# Patient Record
Sex: FEMALE | Race: OTHER | NOT HISPANIC OR LATINO | URBAN - METROPOLITAN AREA
[De-identification: names, ages, dates, MRNs, and addresses within clinical notes are randomized per-mention and may not be internally consistent; named-entity substitution may affect disease eponyms.]

---

## 2017-04-07 ENCOUNTER — OUTPATIENT (OUTPATIENT)
Dept: OUTPATIENT SERVICES | Facility: HOSPITAL | Age: 82
LOS: 1 days | Discharge: HOME | End: 2017-04-07

## 2017-04-07 DIAGNOSIS — C85.88 OTHER SPECIFIED TYPES OF NON-HODGKIN LYMPHOMA, LYMPH NODES OF MULTIPLE SITES: ICD-10-CM

## 2017-04-09 ENCOUNTER — RESULT REVIEW (OUTPATIENT)
Age: 82
End: 2017-04-09

## 2017-04-10 ENCOUNTER — APPOINTMENT (OUTPATIENT)
Dept: HEMATOLOGY ONCOLOGY | Facility: CLINIC | Age: 82
End: 2017-04-10

## 2017-04-10 VITALS
BODY MASS INDEX: 24.37 KG/M2 | TEMPERATURE: 98.5 F | OXYGEN SATURATION: 98 % | WEIGHT: 130.73 LBS | HEART RATE: 83 BPM | SYSTOLIC BLOOD PRESSURE: 147 MMHG | RESPIRATION RATE: 16 BRPM | DIASTOLIC BLOOD PRESSURE: 82 MMHG

## 2017-04-10 LAB
BASOPHILS # BLD AUTO: 0 K/UL — SIGNIFICANT CHANGE UP (ref 0–0.2)
BASOPHILS NFR BLD AUTO: 0.3 % — SIGNIFICANT CHANGE UP (ref 0–2)
EOSINOPHIL # BLD AUTO: 0 K/UL — SIGNIFICANT CHANGE UP (ref 0–0.5)
EOSINOPHIL NFR BLD AUTO: 0.4 % — SIGNIFICANT CHANGE UP (ref 0–6)
HCT VFR BLD CALC: 44.3 % — SIGNIFICANT CHANGE UP (ref 34.5–45)
HGB BLD-MCNC: 15.2 G/DL — SIGNIFICANT CHANGE UP (ref 11.5–15.5)
LYMPHOCYTES # BLD AUTO: 1.3 K/UL — SIGNIFICANT CHANGE UP (ref 1–3.3)
LYMPHOCYTES # BLD AUTO: 20.6 % — SIGNIFICANT CHANGE UP (ref 13–44)
MCHC RBC-ENTMCNC: 32.2 PG — SIGNIFICANT CHANGE UP (ref 27–34)
MCHC RBC-ENTMCNC: 34.4 G/DL — SIGNIFICANT CHANGE UP (ref 32–36)
MCV RBC AUTO: 93.6 FL — SIGNIFICANT CHANGE UP (ref 80–100)
MONOCYTES # BLD AUTO: 0.8 K/UL — SIGNIFICANT CHANGE UP (ref 0–0.9)
MONOCYTES NFR BLD AUTO: 12.1 % — SIGNIFICANT CHANGE UP (ref 2–14)
NEUTROPHILS # BLD AUTO: 4.2 K/UL — SIGNIFICANT CHANGE UP (ref 1.8–7.4)
NEUTROPHILS NFR BLD AUTO: 66.6 % — SIGNIFICANT CHANGE UP (ref 43–77)
PLATELET # BLD AUTO: 225 K/UL — SIGNIFICANT CHANGE UP (ref 150–400)
RBC # BLD: 4.73 M/UL — SIGNIFICANT CHANGE UP (ref 3.8–5.2)
RBC # FLD: 11.5 % — SIGNIFICANT CHANGE UP (ref 10.3–14.5)
WBC # BLD: 6.3 K/UL — SIGNIFICANT CHANGE UP (ref 3.8–10.5)
WBC # FLD AUTO: 6.3 K/UL — SIGNIFICANT CHANGE UP (ref 3.8–10.5)

## 2017-04-16 ENCOUNTER — FORM ENCOUNTER (OUTPATIENT)
Age: 82
End: 2017-04-16

## 2017-04-17 ENCOUNTER — APPOINTMENT (OUTPATIENT)
Dept: NUCLEAR MEDICINE | Facility: IMAGING CENTER | Age: 82
End: 2017-04-17

## 2017-04-17 ENCOUNTER — OUTPATIENT (OUTPATIENT)
Dept: OUTPATIENT SERVICES | Facility: HOSPITAL | Age: 82
LOS: 1 days | End: 2017-04-17
Payer: MEDICARE

## 2017-04-17 DIAGNOSIS — C82.80 OTHER TYPES OF FOLLICULAR LYMPHOMA, UNSPECIFIED SITE: ICD-10-CM

## 2017-04-17 PROCEDURE — 78815 PET IMAGE W/CT SKULL-THIGH: CPT

## 2017-04-17 PROCEDURE — A9552: CPT

## 2017-04-19 ENCOUNTER — OTHER (OUTPATIENT)
Age: 82
End: 2017-04-19

## 2017-05-02 ENCOUNTER — FORM ENCOUNTER (OUTPATIENT)
Age: 82
End: 2017-05-02

## 2017-05-03 ENCOUNTER — APPOINTMENT (OUTPATIENT)
Dept: ULTRASOUND IMAGING | Facility: IMAGING CENTER | Age: 82
End: 2017-05-03

## 2017-05-03 ENCOUNTER — OUTPATIENT (OUTPATIENT)
Dept: OUTPATIENT SERVICES | Facility: HOSPITAL | Age: 82
LOS: 1 days | End: 2017-05-03
Payer: MEDICARE

## 2017-05-03 ENCOUNTER — RESULT REVIEW (OUTPATIENT)
Age: 82
End: 2017-05-03

## 2017-05-03 DIAGNOSIS — C82.80 OTHER TYPES OF FOLLICULAR LYMPHOMA, UNSPECIFIED SITE: ICD-10-CM

## 2017-05-03 PROCEDURE — 38505 NEEDLE BIOPSY LYMPH NODES: CPT

## 2017-05-03 PROCEDURE — 76942 ECHO GUIDE FOR BIOPSY: CPT

## 2017-05-03 PROCEDURE — 88172 CYTP DX EVAL FNA 1ST EA SITE: CPT

## 2017-05-03 PROCEDURE — 88305 TISSUE EXAM BY PATHOLOGIST: CPT

## 2017-05-03 PROCEDURE — 88342 IMHCHEM/IMCYTCHM 1ST ANTB: CPT

## 2017-05-03 PROCEDURE — 88173 CYTOPATH EVAL FNA REPORT: CPT

## 2017-05-03 PROCEDURE — 88341 IMHCHEM/IMCYTCHM EA ADD ANTB: CPT

## 2017-05-03 PROCEDURE — 88360 TUMOR IMMUNOHISTOCHEM/MANUAL: CPT

## 2017-05-05 LAB — NON-GYNECOLOGICAL CYTOLOGY STUDY: SIGNIFICANT CHANGE UP

## 2017-05-08 LAB — TM INTERPRETATION: SIGNIFICANT CHANGE UP

## 2017-10-19 ENCOUNTER — OUTPATIENT (OUTPATIENT)
Dept: OUTPATIENT SERVICES | Facility: HOSPITAL | Age: 82
LOS: 1 days | Discharge: HOME | End: 2017-10-19

## 2017-10-19 DIAGNOSIS — C85.88 OTHER SPECIFIED TYPES OF NON-HODGKIN LYMPHOMA, LYMPH NODES OF MULTIPLE SITES: ICD-10-CM

## 2017-10-24 ENCOUNTER — APPOINTMENT (OUTPATIENT)
Dept: HEMATOLOGY ONCOLOGY | Facility: CLINIC | Age: 82
End: 2017-10-24
Payer: MEDICARE

## 2017-10-24 ENCOUNTER — RESULT REVIEW (OUTPATIENT)
Age: 82
End: 2017-10-24

## 2017-10-24 VITALS
TEMPERATURE: 98.4 F | WEIGHT: 130.07 LBS | BODY MASS INDEX: 24.24 KG/M2 | DIASTOLIC BLOOD PRESSURE: 78 MMHG | RESPIRATION RATE: 16 BRPM | OXYGEN SATURATION: 98 % | HEART RATE: 78 BPM | SYSTOLIC BLOOD PRESSURE: 140 MMHG

## 2017-10-24 LAB
BASOPHILS # BLD AUTO: 0.1 K/UL — SIGNIFICANT CHANGE UP (ref 0–0.2)
BASOPHILS NFR BLD AUTO: 1 % — SIGNIFICANT CHANGE UP (ref 0–2)
EOSINOPHIL # BLD AUTO: 0.1 K/UL — SIGNIFICANT CHANGE UP (ref 0–0.5)
EOSINOPHIL NFR BLD AUTO: 1.5 % — SIGNIFICANT CHANGE UP (ref 0–6)
HCT VFR BLD CALC: 42.9 % — SIGNIFICANT CHANGE UP (ref 34.5–45)
HGB BLD-MCNC: 15.1 G/DL — SIGNIFICANT CHANGE UP (ref 11.5–15.5)
LYMPHOCYTES # BLD AUTO: 1.7 K/UL — SIGNIFICANT CHANGE UP (ref 1–3.3)
LYMPHOCYTES # BLD AUTO: 21.8 % — SIGNIFICANT CHANGE UP (ref 13–44)
MCHC RBC-ENTMCNC: 32.3 PG — SIGNIFICANT CHANGE UP (ref 27–34)
MCHC RBC-ENTMCNC: 35.3 G/DL — SIGNIFICANT CHANGE UP (ref 32–36)
MCV RBC AUTO: 91.7 FL — SIGNIFICANT CHANGE UP (ref 80–100)
MONOCYTES # BLD AUTO: 0.9 K/UL — SIGNIFICANT CHANGE UP (ref 0–0.9)
MONOCYTES NFR BLD AUTO: 11.4 % — SIGNIFICANT CHANGE UP (ref 2–14)
NEUTROPHILS # BLD AUTO: 4.9 K/UL — SIGNIFICANT CHANGE UP (ref 1.8–7.4)
NEUTROPHILS NFR BLD AUTO: 64.3 % — SIGNIFICANT CHANGE UP (ref 43–77)
PLATELET # BLD AUTO: 267 K/UL — SIGNIFICANT CHANGE UP (ref 150–400)
RBC # BLD: 4.68 M/UL — SIGNIFICANT CHANGE UP (ref 3.8–5.2)
RBC # FLD: 11.7 % — SIGNIFICANT CHANGE UP (ref 10.3–14.5)
WBC # BLD: 7.6 K/UL — SIGNIFICANT CHANGE UP (ref 3.8–10.5)
WBC # FLD AUTO: 7.6 K/UL — SIGNIFICANT CHANGE UP (ref 3.8–10.5)

## 2017-10-24 PROCEDURE — 99214 OFFICE O/P EST MOD 30 MIN: CPT

## 2017-12-22 LAB
ALBUMIN SERPL ELPH-MCNC: 4.3 G/DL
ALP BLD-CCNC: 118 U/L
ALT SERPL-CCNC: 18 U/L
ANION GAP SERPL CALC-SCNC: 15 MMOL/L
AST SERPL-CCNC: 25 U/L
B2 MICROGLOB SERPL-MCNC: 2 MG/L
BILIRUB SERPL-MCNC: 0.5 MG/DL
BUN SERPL-MCNC: 11 MG/DL
CALCIUM SERPL-MCNC: 10.2 MG/DL
CHLORIDE SERPL-SCNC: 103 MMOL/L
CO2 SERPL-SCNC: 25 MMOL/L
CREAT SERPL-MCNC: 0.8 MG/DL
DEPRECATED KAPPA LC FREE/LAMBDA SER: 1.26 RATIO
GLUCOSE SERPL-MCNC: 117 MG/DL
IGA SER QL IEP: 71 MG/DL
IGG SER QL IEP: 628 MG/DL
IGM SER QL IEP: 21 MG/DL
KAPPA LC CSF-MCNC: 0.91 MG/DL
KAPPA LC SERPL-MCNC: 1.15 MG/DL
LDH SERPL-CCNC: 172 U/L
M PROTEIN SPEC IFE-MCNC: NORMAL
POTASSIUM SERPL-SCNC: 4.1 MMOL/L
PROT SERPL-MCNC: 6.5 G/DL
SODIUM SERPL-SCNC: 143 MMOL/L

## 2018-03-08 ENCOUNTER — OUTPATIENT (OUTPATIENT)
Dept: OUTPATIENT SERVICES | Facility: HOSPITAL | Age: 83
LOS: 1 days | Discharge: HOME | End: 2018-03-08

## 2018-03-08 DIAGNOSIS — C85.88 OTHER SPECIFIED TYPES OF NON-HODGKIN LYMPHOMA, LYMPH NODES OF MULTIPLE SITES: ICD-10-CM

## 2018-03-12 ENCOUNTER — APPOINTMENT (OUTPATIENT)
Dept: HEMATOLOGY ONCOLOGY | Facility: CLINIC | Age: 83
End: 2018-03-12
Payer: MEDICARE

## 2018-03-12 ENCOUNTER — RESULT REVIEW (OUTPATIENT)
Age: 83
End: 2018-03-12

## 2018-03-12 VITALS
WEIGHT: 131 LBS | RESPIRATION RATE: 16 BRPM | DIASTOLIC BLOOD PRESSURE: 85 MMHG | SYSTOLIC BLOOD PRESSURE: 150 MMHG | OXYGEN SATURATION: 99 % | TEMPERATURE: 98 F | BODY MASS INDEX: 24.42 KG/M2 | HEART RATE: 109 BPM

## 2018-03-12 LAB
BASOPHILS # BLD AUTO: 0 K/UL — SIGNIFICANT CHANGE UP (ref 0–0.2)
BASOPHILS NFR BLD AUTO: 0.5 % — SIGNIFICANT CHANGE UP (ref 0–2)
EOSINOPHIL # BLD AUTO: 0 K/UL — SIGNIFICANT CHANGE UP (ref 0–0.5)
EOSINOPHIL NFR BLD AUTO: 0.2 % — SIGNIFICANT CHANGE UP (ref 0–6)
HCT VFR BLD CALC: 43.5 % — SIGNIFICANT CHANGE UP (ref 34.5–45)
HGB BLD-MCNC: 14.9 G/DL — SIGNIFICANT CHANGE UP (ref 11.5–15.5)
LYMPHOCYTES # BLD AUTO: 1.3 K/UL — SIGNIFICANT CHANGE UP (ref 1–3.3)
LYMPHOCYTES # BLD AUTO: 17.9 % — SIGNIFICANT CHANGE UP (ref 13–44)
MCHC RBC-ENTMCNC: 31.7 PG — SIGNIFICANT CHANGE UP (ref 27–34)
MCHC RBC-ENTMCNC: 34.3 G/DL — SIGNIFICANT CHANGE UP (ref 32–36)
MCV RBC AUTO: 92.2 FL — SIGNIFICANT CHANGE UP (ref 80–100)
MONOCYTES # BLD AUTO: 0.7 K/UL — SIGNIFICANT CHANGE UP (ref 0–0.9)
MONOCYTES NFR BLD AUTO: 9.3 % — SIGNIFICANT CHANGE UP (ref 2–14)
NEUTROPHILS # BLD AUTO: 5.2 K/UL — SIGNIFICANT CHANGE UP (ref 1.8–7.4)
NEUTROPHILS NFR BLD AUTO: 72 % — SIGNIFICANT CHANGE UP (ref 43–77)
PLATELET # BLD AUTO: 240 K/UL — SIGNIFICANT CHANGE UP (ref 150–400)
RBC # BLD: 4.71 M/UL — SIGNIFICANT CHANGE UP (ref 3.8–5.2)
RBC # FLD: 11.8 % — SIGNIFICANT CHANGE UP (ref 10.3–14.5)
WBC # BLD: 7.2 K/UL — SIGNIFICANT CHANGE UP (ref 3.8–10.5)
WBC # FLD AUTO: 7.2 K/UL — SIGNIFICANT CHANGE UP (ref 3.8–10.5)

## 2018-03-12 PROCEDURE — 99214 OFFICE O/P EST MOD 30 MIN: CPT

## 2018-03-13 LAB
ALBUMIN SERPL ELPH-MCNC: 4.4 G/DL
ALP BLD-CCNC: 115 U/L
ALT SERPL-CCNC: 17 U/L
ANION GAP SERPL CALC-SCNC: 13 MMOL/L
AST SERPL-CCNC: 28 U/L
BILIRUB SERPL-MCNC: 0.4 MG/DL
BUN SERPL-MCNC: 11 MG/DL
CALCIUM SERPL-MCNC: 10 MG/DL
CHLORIDE SERPL-SCNC: 102 MMOL/L
CO2 SERPL-SCNC: 25 MMOL/L
CREAT SERPL-MCNC: 0.8 MG/DL
GLUCOSE SERPL-MCNC: 112 MG/DL
LDH SERPL-CCNC: 192 U/L
POTASSIUM SERPL-SCNC: 4.8 MMOL/L
PROT SERPL-MCNC: 6.6 G/DL
SODIUM SERPL-SCNC: 140 MMOL/L

## 2018-07-09 ENCOUNTER — OUTPATIENT (OUTPATIENT)
Dept: OUTPATIENT SERVICES | Facility: HOSPITAL | Age: 83
LOS: 1 days | Discharge: HOME | End: 2018-07-09

## 2018-07-09 DIAGNOSIS — C85.88 OTHER SPECIFIED TYPES OF NON-HODGKIN LYMPHOMA, LYMPH NODES OF MULTIPLE SITES: ICD-10-CM

## 2018-07-12 ENCOUNTER — RESULT REVIEW (OUTPATIENT)
Age: 83
End: 2018-07-12

## 2018-07-12 ENCOUNTER — APPOINTMENT (OUTPATIENT)
Dept: HEMATOLOGY ONCOLOGY | Facility: CLINIC | Age: 83
End: 2018-07-12
Payer: MEDICARE

## 2018-07-12 VITALS
BODY MASS INDEX: 24.24 KG/M2 | HEART RATE: 94 BPM | TEMPERATURE: 97.5 F | RESPIRATION RATE: 17 BRPM | WEIGHT: 130.07 LBS | DIASTOLIC BLOOD PRESSURE: 80 MMHG | OXYGEN SATURATION: 99 % | SYSTOLIC BLOOD PRESSURE: 120 MMHG

## 2018-07-12 LAB
BASOPHILS # BLD AUTO: 0.1 K/UL — SIGNIFICANT CHANGE UP (ref 0–0.2)
BASOPHILS NFR BLD AUTO: 1 % — SIGNIFICANT CHANGE UP (ref 0–2)
EOSINOPHIL # BLD AUTO: 0 K/UL — SIGNIFICANT CHANGE UP (ref 0–0.5)
EOSINOPHIL NFR BLD AUTO: 0.5 % — SIGNIFICANT CHANGE UP (ref 0–6)
HCT VFR BLD CALC: 45.8 % — HIGH (ref 34.5–45)
HGB BLD-MCNC: 15.4 G/DL — SIGNIFICANT CHANGE UP (ref 11.5–15.5)
LYMPHOCYTES # BLD AUTO: 1.3 K/UL — SIGNIFICANT CHANGE UP (ref 1–3.3)
LYMPHOCYTES # BLD AUTO: 17.3 % — SIGNIFICANT CHANGE UP (ref 13–44)
MCHC RBC-ENTMCNC: 30.8 PG — SIGNIFICANT CHANGE UP (ref 27–34)
MCHC RBC-ENTMCNC: 33.5 G/DL — SIGNIFICANT CHANGE UP (ref 32–36)
MCV RBC AUTO: 92 FL — SIGNIFICANT CHANGE UP (ref 80–100)
MONOCYTES # BLD AUTO: 0.7 K/UL — SIGNIFICANT CHANGE UP (ref 0–0.9)
MONOCYTES NFR BLD AUTO: 9.2 % — SIGNIFICANT CHANGE UP (ref 2–14)
NEUTROPHILS # BLD AUTO: 5.5 K/UL — SIGNIFICANT CHANGE UP (ref 1.8–7.4)
NEUTROPHILS NFR BLD AUTO: 72 % — SIGNIFICANT CHANGE UP (ref 43–77)
PLATELET # BLD AUTO: 257 K/UL — SIGNIFICANT CHANGE UP (ref 150–400)
RBC # BLD: 4.98 M/UL — SIGNIFICANT CHANGE UP (ref 3.8–5.2)
RBC # FLD: 11.4 % — SIGNIFICANT CHANGE UP (ref 10.3–14.5)
WBC # BLD: 7.7 K/UL — SIGNIFICANT CHANGE UP (ref 3.8–10.5)
WBC # FLD AUTO: 7.7 K/UL — SIGNIFICANT CHANGE UP (ref 3.8–10.5)

## 2018-07-12 PROCEDURE — 99213 OFFICE O/P EST LOW 20 MIN: CPT

## 2018-07-13 LAB
ALBUMIN SERPL ELPH-MCNC: 4.6 G/DL
ALP BLD-CCNC: 124 U/L
ALT SERPL-CCNC: 17 U/L
ANION GAP SERPL CALC-SCNC: 15 MMOL/L
AST SERPL-CCNC: 28 U/L
B2 MICROGLOB SERPL-MCNC: 2 MG/L
BILIRUB SERPL-MCNC: 0.5 MG/DL
BUN SERPL-MCNC: 10 MG/DL
CALCIUM SERPL-MCNC: 10 MG/DL
CHLORIDE SERPL-SCNC: 103 MMOL/L
CO2 SERPL-SCNC: 27 MMOL/L
CREAT SERPL-MCNC: 0.8 MG/DL
DEPRECATED KAPPA LC FREE/LAMBDA SER: 0.89 RATIO
GLUCOSE SERPL-MCNC: 116 MG/DL
IGA SER QL IEP: 78 MG/DL
IGG SER QL IEP: 689 MG/DL
IGM SER QL IEP: 20 MG/DL
KAPPA LC CSF-MCNC: 1.03 MG/DL
KAPPA LC SERPL-MCNC: 0.92 MG/DL
LDH SERPL-CCNC: 180 U/L
POTASSIUM SERPL-SCNC: 4.2 MMOL/L
PROT SERPL-MCNC: 6.9 G/DL
SODIUM SERPL-SCNC: 145 MMOL/L

## 2018-11-01 ENCOUNTER — OUTPATIENT (OUTPATIENT)
Dept: OUTPATIENT SERVICES | Facility: HOSPITAL | Age: 83
LOS: 1 days | Discharge: HOME | End: 2018-11-01

## 2018-11-01 DIAGNOSIS — C85.88 OTHER SPECIFIED TYPES OF NON-HODGKIN LYMPHOMA, LYMPH NODES OF MULTIPLE SITES: ICD-10-CM

## 2018-11-12 ENCOUNTER — APPOINTMENT (OUTPATIENT)
Dept: HEMATOLOGY ONCOLOGY | Facility: CLINIC | Age: 83
End: 2018-11-12
Payer: MEDICARE

## 2018-11-12 ENCOUNTER — RESULT REVIEW (OUTPATIENT)
Age: 83
End: 2018-11-12

## 2018-11-12 VITALS
WEIGHT: 130.07 LBS | BODY MASS INDEX: 24.24 KG/M2 | SYSTOLIC BLOOD PRESSURE: 174 MMHG | OXYGEN SATURATION: 98 % | RESPIRATION RATE: 16 BRPM | HEART RATE: 89 BPM | DIASTOLIC BLOOD PRESSURE: 93 MMHG | TEMPERATURE: 97.4 F

## 2018-11-12 LAB
ALBUMIN SERPL ELPH-MCNC: 4.3 G/DL
ALP BLD-CCNC: 115 U/L
ALT SERPL-CCNC: 15 U/L
ANION GAP SERPL CALC-SCNC: 13 MMOL/L
AST SERPL-CCNC: 24 U/L
B2 MICROGLOB SERPL-MCNC: 2 MG/L
BASOPHILS # BLD AUTO: 0 K/UL — SIGNIFICANT CHANGE UP (ref 0–0.2)
BASOPHILS NFR BLD AUTO: 0.5 % — SIGNIFICANT CHANGE UP (ref 0–2)
BILIRUB SERPL-MCNC: 0.4 MG/DL
BUN SERPL-MCNC: 7 MG/DL
CALCIUM SERPL-MCNC: 9.4 MG/DL
CHLORIDE SERPL-SCNC: 103 MMOL/L
CO2 SERPL-SCNC: 25 MMOL/L
CREAT SERPL-MCNC: 0.7 MG/DL
EOSINOPHIL # BLD AUTO: 0 K/UL — SIGNIFICANT CHANGE UP (ref 0–0.5)
EOSINOPHIL NFR BLD AUTO: 0.7 % — SIGNIFICANT CHANGE UP (ref 0–6)
GLUCOSE SERPL-MCNC: 109 MG/DL
HCT VFR BLD CALC: 44 % — SIGNIFICANT CHANGE UP (ref 34.5–45)
HGB BLD-MCNC: 14.8 G/DL — SIGNIFICANT CHANGE UP (ref 11.5–15.5)
LDH SERPL-CCNC: 186 U/L
LYMPHOCYTES # BLD AUTO: 1.3 K/UL — SIGNIFICANT CHANGE UP (ref 1–3.3)
LYMPHOCYTES # BLD AUTO: 18.1 % — SIGNIFICANT CHANGE UP (ref 13–44)
MCHC RBC-ENTMCNC: 31 PG — SIGNIFICANT CHANGE UP (ref 27–34)
MCHC RBC-ENTMCNC: 33.6 G/DL — SIGNIFICANT CHANGE UP (ref 32–36)
MCV RBC AUTO: 92.1 FL — SIGNIFICANT CHANGE UP (ref 80–100)
MONOCYTES # BLD AUTO: 0.8 K/UL — SIGNIFICANT CHANGE UP (ref 0–0.9)
MONOCYTES NFR BLD AUTO: 10.3 % — SIGNIFICANT CHANGE UP (ref 2–14)
NEUTROPHILS # BLD AUTO: 5.2 K/UL — SIGNIFICANT CHANGE UP (ref 1.8–7.4)
NEUTROPHILS NFR BLD AUTO: 70.5 % — SIGNIFICANT CHANGE UP (ref 43–77)
PLATELET # BLD AUTO: 256 K/UL — SIGNIFICANT CHANGE UP (ref 150–400)
POTASSIUM SERPL-SCNC: 4.2 MMOL/L
PROT SERPL-MCNC: 6.3 G/DL
RBC # BLD: 4.78 M/UL — SIGNIFICANT CHANGE UP (ref 3.8–5.2)
RBC # FLD: 11.5 % — SIGNIFICANT CHANGE UP (ref 10.3–14.5)
SODIUM SERPL-SCNC: 141 MMOL/L
WBC # BLD: 7.4 K/UL — SIGNIFICANT CHANGE UP (ref 3.8–10.5)
WBC # FLD AUTO: 7.4 K/UL — SIGNIFICANT CHANGE UP (ref 3.8–10.5)

## 2018-11-12 PROCEDURE — 99214 OFFICE O/P EST MOD 30 MIN: CPT

## 2018-11-14 NOTE — ASSESSMENT
[Palliative] : Goals of care discussed with patient: Palliative [Palliative Care Plan] : not applicable at this time [FreeTextEntry1] : FL with isolated recurrence in left inguinal node.  Imaging deferred for now; to use clinical indications.   \par Check CBC, CMP, LDH, beta-2 microglobulin\par f/u 4 months\par

## 2018-11-14 NOTE — HISTORY OF PRESENT ILLNESS
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  She was first diagnosed in July, 1995; pathology a read at [facility] as follicular small cleaved cell with focal diffuse areas. .She receive RT to an axillary area of disease early in her course, and then developed significant  adenopathy in 2006; beginning in April, 2006 she received rituximab weekly x 4 with an excellent response.  Upon progression in Feb., 2010, rebiopsy showed FL, gr 1-2.  She received rituximab weekly x 4 followed by two years of rituximab maintenance using weekly x 4, q6 month schedule.  She had an excellent response.  In Dec., 2010 she was hospitalized in [facility]. with a CAP. She was last imaged in May, 2012: she had no evidence of lymphoma.  She had a stable left adnexal dermoid.    [FreeTextEntry1] : s/p rituximab and rituximab maintenance [de-identified] : FL  biopsy proven, left inguinal node.  No change in size of node or new adenopathy since biopsy.\par Feels well, no constitutional c/o\par \par She has had no recurrence after Moh's surgery for a large basal cell carcinoma of the left thigh.  No new skin lesions.recommend to continue follow up with DErm \par Takes no medications.\par health Maintenance- got Flu vaccine last week

## 2018-11-14 NOTE — PHYSICAL EXAM
[Fully active, able to carry on all pre-disease performance without restriction] : Status 0 - Fully active, able to carry on all pre-disease performance without restriction [Normal] : affect appropriate [de-identified] : small elft inguinal node about 1 cm thinks may have gotten smaller

## 2018-11-14 NOTE — REVIEW OF SYSTEMS
[Negative] : Allergic/Immunologic [FreeTextEntry2] : flu vaccine last week [FreeTextEntry3] : laser surgery both eyes

## 2019-03-06 ENCOUNTER — OUTPATIENT (OUTPATIENT)
Dept: OUTPATIENT SERVICES | Facility: HOSPITAL | Age: 84
LOS: 1 days | Discharge: HOME | End: 2019-03-06

## 2019-03-06 DIAGNOSIS — C85.88 OTHER SPECIFIED TYPES OF NON-HODGKIN LYMPHOMA, LYMPH NODES OF MULTIPLE SITES: ICD-10-CM

## 2019-03-14 ENCOUNTER — RESULT REVIEW (OUTPATIENT)
Age: 84
End: 2019-03-14

## 2019-03-14 ENCOUNTER — APPOINTMENT (OUTPATIENT)
Dept: HEMATOLOGY ONCOLOGY | Facility: CLINIC | Age: 84
End: 2019-03-14
Payer: MEDICARE

## 2019-03-14 VITALS
WEIGHT: 128.97 LBS | SYSTOLIC BLOOD PRESSURE: 160 MMHG | TEMPERATURE: 97.7 F | HEART RATE: 110 BPM | OXYGEN SATURATION: 100 % | BODY MASS INDEX: 24.04 KG/M2 | RESPIRATION RATE: 17 BRPM | DIASTOLIC BLOOD PRESSURE: 83 MMHG

## 2019-03-14 DIAGNOSIS — Z83.79 FAMILY HISTORY OF OTHER DISEASES OF THE DIGESTIVE SYSTEM: ICD-10-CM

## 2019-03-14 LAB
BASOPHILS # BLD AUTO: 0.1 K/UL — SIGNIFICANT CHANGE UP (ref 0–0.2)
BASOPHILS NFR BLD AUTO: 0.8 % — SIGNIFICANT CHANGE UP (ref 0–2)
EOSINOPHIL # BLD AUTO: 0.1 K/UL — SIGNIFICANT CHANGE UP (ref 0–0.5)
EOSINOPHIL NFR BLD AUTO: 1 % — SIGNIFICANT CHANGE UP (ref 0–6)
HCT VFR BLD CALC: 45.2 % — HIGH (ref 34.5–45)
HGB BLD-MCNC: 14.9 G/DL — SIGNIFICANT CHANGE UP (ref 11.5–15.5)
LYMPHOCYTES # BLD AUTO: 1.3 K/UL — SIGNIFICANT CHANGE UP (ref 1–3.3)
LYMPHOCYTES # BLD AUTO: 19.9 % — SIGNIFICANT CHANGE UP (ref 13–44)
MCHC RBC-ENTMCNC: 30.1 PG — SIGNIFICANT CHANGE UP (ref 27–34)
MCHC RBC-ENTMCNC: 33 G/DL — SIGNIFICANT CHANGE UP (ref 32–36)
MCV RBC AUTO: 91.1 FL — SIGNIFICANT CHANGE UP (ref 80–100)
MONOCYTES # BLD AUTO: 0.8 K/UL — SIGNIFICANT CHANGE UP (ref 0–0.9)
MONOCYTES NFR BLD AUTO: 12.3 % — SIGNIFICANT CHANGE UP (ref 2–14)
NEUTROPHILS # BLD AUTO: 4.2 K/UL — SIGNIFICANT CHANGE UP (ref 1.8–7.4)
NEUTROPHILS NFR BLD AUTO: 66 % — SIGNIFICANT CHANGE UP (ref 43–77)
PLATELET # BLD AUTO: 267 K/UL — SIGNIFICANT CHANGE UP (ref 150–400)
RBC # BLD: 4.96 M/UL — SIGNIFICANT CHANGE UP (ref 3.8–5.2)
RBC # FLD: 11.7 % — SIGNIFICANT CHANGE UP (ref 10.3–14.5)
WBC # BLD: 6.4 K/UL — SIGNIFICANT CHANGE UP (ref 3.8–10.5)
WBC # FLD AUTO: 6.4 K/UL — SIGNIFICANT CHANGE UP (ref 3.8–10.5)

## 2019-03-14 PROCEDURE — 99214 OFFICE O/P EST MOD 30 MIN: CPT

## 2019-03-14 NOTE — ADDENDUM
[FreeTextEntry1] : Documented by Bella Pierce acting as a scribe for Dr. Jean Claude Osullivan on 3/14/19. \par \par All medical record entries made by the Scribe were at my, Dr. Jean Claude Osullivan's, direction and personally dictated by me on 3/14/19. I have reviewed the chart and agree that the record accurately reflects my personal performance of the history, physical exam, procedure and imaging.\par

## 2019-03-14 NOTE — HISTORY OF PRESENT ILLNESS
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  She was first diagnosed in July, 1995; pathology a read at [facility] as follicular small cleaved cell with focal diffuse areas. .She receive RT to an axillary area of disease early in her course, and then developed significant  adenopathy in 2006; beginning in April, 2006 she received rituximab weekly x 4 with an excellent response.  Upon progression in Feb., 2010, rebiopsy showed FL, gr 1-2.  She received rituximab weekly x 4 followed by two years of rituximab maintenance using weekly x 4, q6 month schedule.  She had an excellent response.  In Dec., 2010 she was hospitalized in [facility]. with a CAP. She was last imaged in May, 2012: she had no evidence of lymphoma.  She had a stable left adnexal dermoid.    [FreeTextEntry1] : s/p rituximab and rituximab maintenance [de-identified] : Recurrent FL, left inguinal node. Last imaging in 2017 showed bilateral inguinal adenopathy and no adenopathy elsewhere. \par Recommended to cont f/u with derm, but has not had derm follow up since last seen here. \par FL biopsy proven, left inguinal node, documented in 5/2017.  No change in size of node or new adenopathy since biopsy.\par PET CT 4/2017 had shown two left inguinal nodes measuring up to 2.7 cm with SUV 5.4. Afterwards she has been followed off therapy without clinical evidence of progression. \par Feels well, no constitutional c/o. \par CBC today shows WBC 6.4, Hgb 14.9, plt 267K\par Beta 2 MG in 12/2018 was 2.0 and the LDH was 186.

## 2019-03-14 NOTE — ASSESSMENT
[Palliative] : Goals of care discussed with patient: Palliative [Palliative Care Plan] : not applicable at this time [FreeTextEntry1] : History of FL originally dating to 1995, initially tx with RT to the axilla for stage I disease. In 2006, developed significant adenopathy and received 4 weekly doses of Rituxan with excellent response. Bx on 2010 showed FL, gr 1-2 after which she again received 4 weekly doses of Rituxan followed by 2 years of Rituxan maintenance. She again remained in remission until 2017 at which time she developed isolated adenopathy in left inguinal region which again bx proved to be FL. Since then she has been followed without evidence of progression. She will continue to be followed in this manner as long as she remains asymptomatic. She continues with an excellent response.    \par CBC, CMP, LDH, beta-2 microglobulin were drawn today\par She was again told, particularly in light of her squamous cell carcinoma of skin which was significant, she needs to have a regular dermatologic follow up and she indicated that she will do so. \par RV 4 months

## 2019-03-14 NOTE — PHYSICAL EXAM
[Fully active, able to carry on all pre-disease performance without restriction] : Status 0 - Fully active, able to carry on all pre-disease performance without restriction [Normal] : affect appropriate [de-identified] : LE venous  [de-identified] : medial left inguinal node is no larger than on last exam maybe somewhat smaller, there is no significant adenopathy on the right \par \par medial left inguinal node is no larger than on last exam maybe somewhat smaller, there is no significant adenopathy on the right \par medial left inguinal node is no larger than on last exam maybe somewhat smaller, there is no significant adenopathy on the right \par  [de-identified] : no CVAT

## 2019-07-11 ENCOUNTER — OUTPATIENT (OUTPATIENT)
Dept: OUTPATIENT SERVICES | Facility: HOSPITAL | Age: 84
LOS: 1 days | Discharge: HOME | End: 2019-07-11

## 2019-07-11 DIAGNOSIS — C85.88 OTHER SPECIFIED TYPES OF NON-HODGKIN LYMPHOMA, LYMPH NODES OF MULTIPLE SITES: ICD-10-CM

## 2019-07-12 LAB
ALBUMIN SERPL ELPH-MCNC: 4.5 G/DL
ALP BLD-CCNC: 114 U/L
ALT SERPL-CCNC: 17 U/L
ANION GAP SERPL CALC-SCNC: 13 MMOL/L
AST SERPL-CCNC: 25 U/L
B2 MICROGLOB SERPL-MCNC: 2.1 MG/L
BILIRUB SERPL-MCNC: 0.4 MG/DL
BUN SERPL-MCNC: 8 MG/DL
CALCIUM SERPL-MCNC: 9.7 MG/DL
CHLORIDE SERPL-SCNC: 103 MMOL/L
CO2 SERPL-SCNC: 25 MMOL/L
CREAT SERPL-MCNC: 0.76 MG/DL
GLUCOSE SERPL-MCNC: 115 MG/DL
LDH SERPL-CCNC: 192 U/L
POTASSIUM SERPL-SCNC: 4.5 MMOL/L
PROT SERPL-MCNC: 6.3 G/DL
SODIUM SERPL-SCNC: 141 MMOL/L

## 2019-07-15 ENCOUNTER — RESULT REVIEW (OUTPATIENT)
Age: 84
End: 2019-07-15

## 2019-07-15 ENCOUNTER — APPOINTMENT (OUTPATIENT)
Dept: HEMATOLOGY ONCOLOGY | Facility: CLINIC | Age: 84
End: 2019-07-15
Payer: MEDICARE

## 2019-07-15 VITALS
BODY MASS INDEX: 23.63 KG/M2 | DIASTOLIC BLOOD PRESSURE: 96 MMHG | HEART RATE: 109 BPM | WEIGHT: 126.76 LBS | RESPIRATION RATE: 17 BRPM | SYSTOLIC BLOOD PRESSURE: 152 MMHG | TEMPERATURE: 98 F | OXYGEN SATURATION: 100 %

## 2019-07-15 LAB
BASOPHILS # BLD AUTO: 0.1 K/UL — SIGNIFICANT CHANGE UP (ref 0–0.2)
BASOPHILS NFR BLD AUTO: 0.8 % — SIGNIFICANT CHANGE UP (ref 0–2)
EOSINOPHIL # BLD AUTO: 0 K/UL — SIGNIFICANT CHANGE UP (ref 0–0.5)
EOSINOPHIL NFR BLD AUTO: 0.2 % — SIGNIFICANT CHANGE UP (ref 0–6)
HCT VFR BLD CALC: 46.2 % — HIGH (ref 34.5–45)
HGB BLD-MCNC: 15.5 G/DL — SIGNIFICANT CHANGE UP (ref 11.5–15.5)
LYMPHOCYTES # BLD AUTO: 1.5 K/UL — SIGNIFICANT CHANGE UP (ref 1–3.3)
LYMPHOCYTES # BLD AUTO: 19.3 % — SIGNIFICANT CHANGE UP (ref 13–44)
MCHC RBC-ENTMCNC: 31 PG — SIGNIFICANT CHANGE UP (ref 27–34)
MCHC RBC-ENTMCNC: 33.4 G/DL — SIGNIFICANT CHANGE UP (ref 32–36)
MCV RBC AUTO: 92.8 FL — SIGNIFICANT CHANGE UP (ref 80–100)
MONOCYTES # BLD AUTO: 0.9 K/UL — SIGNIFICANT CHANGE UP (ref 0–0.9)
MONOCYTES NFR BLD AUTO: 12.1 % — SIGNIFICANT CHANGE UP (ref 2–14)
NEUTROPHILS # BLD AUTO: 5.3 K/UL — SIGNIFICANT CHANGE UP (ref 1.8–7.4)
NEUTROPHILS NFR BLD AUTO: 67.7 % — SIGNIFICANT CHANGE UP (ref 43–77)
PLATELET # BLD AUTO: 256 K/UL — SIGNIFICANT CHANGE UP (ref 150–400)
RBC # BLD: 4.98 M/UL — SIGNIFICANT CHANGE UP (ref 3.8–5.2)
RBC # FLD: 11.3 % — SIGNIFICANT CHANGE UP (ref 10.3–14.5)
WBC # BLD: 7.8 K/UL — SIGNIFICANT CHANGE UP (ref 3.8–10.5)
WBC # FLD AUTO: 7.8 K/UL — SIGNIFICANT CHANGE UP (ref 3.8–10.5)

## 2019-07-15 PROCEDURE — 99213 OFFICE O/P EST LOW 20 MIN: CPT

## 2019-07-15 NOTE — REVIEW OF SYSTEMS
[Negative] : Allergic/Immunologic [de-identified] : Confluent adenopathy in the 1-2 cm range left inguinal region also small nodes right inguinal. About 2 cm left femoral node.

## 2019-07-15 NOTE — ADDENDUM
[FreeTextEntry1] : Documented by Lillie Osman acting as a scribe for Dr. Jean Claude Osullivan on 07/15/2019.\par \par All medical record entries made by a scribe were at my, Dr. Jean Claude Osullivan direction and personally dictated by me on 07/15/2019. I have reviewed the chart and agree that the record accurately reflects my personal performance of the history, physical exam, procedure and imaging.\par

## 2019-07-15 NOTE — ASSESSMENT
[Palliative] : Goals of care discussed with patient: Palliative [Palliative Care Plan] : not applicable at this time [FreeTextEntry1] : History of FL originally dating to 1995, initially tx with RT to the axilla for stage I disease. In 2006, developed significant adenopathy and received 4 weekly doses of Rituxan with excellent response. Bx on 2010 showed FL, gr 1-2 after which she again received 4 weekly doses of Rituxan followed by 2 years of Rituxan maintenance. She again remained in remission until 2017 at which time she developed isolated adenopathy in left inguinal region which again bx proved to be FL. Since then she has been followed without evidence of progression until now. She has a 2cm femoral node which I have not previously palpated. Clinical f/u will continue for now and new imaging will be done if new adenopathy develops.\par CBC, CMP, LDH, beta-2 microglobulin were drawn today\par H/o squamous cell carcinoma of skin which was significant, she will have derm f/u soon.\par RV 4 months

## 2019-07-15 NOTE — PHYSICAL EXAM
[Fully active, able to carry on all pre-disease performance without restriction] : Status 0 - Fully active, able to carry on all pre-disease performance without restriction [Normal] : affect appropriate [de-identified] : LE venous  [de-identified] : medial left inguinal node is no larger than on last exam maybe somewhat smaller, there is no significant adenopathy on the right \par \par medial left inguinal node is no larger than on last exam maybe somewhat smaller, there is no significant adenopathy on the right \par medial left inguinal node is no larger than on last exam maybe somewhat smaller, there is no significant adenopathy on the right \par  [de-identified] : no CVAT [de-identified] : Confluent adenopathy in the 1-2 cm range left inguinal region also small nodes right inguinal. About 2 cm left femoral node. [de-identified] : several seborrheic keratoses

## 2019-07-15 NOTE — HISTORY OF PRESENT ILLNESS
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  She was first diagnosed in July, 1995; [place] [facility] cleaved cell with focal diffuse areas. .She receive RT to an axillary area of disease early in her course, and then developed significant  adenopathy in 2006; beginning in April, 2006 she received rituximab weekly x 4 with an excellent response.  Upon progression in Feb., 2010, rebiopsy showed FL, gr 1-2.  She received rituximab weekly x 4 followed by two years of rituximab maintenance using weekly x 4, q6 month schedule.  She had an excellent response.  In Dec., 2010 she was hospitalized in [facility]. with a CAP. She was last imaged in May, 2012: she had no evidence of lymphoma.  She had a stable left adnexal dermoid.    [FreeTextEntry1] : s/p rituximab and rituximab maintenance [de-identified] : Recurrent FL, left inguinal node. Last imaging in 2017 showed bilateral inguinal adenopathy. Notes new left femoral node. Non-tender. \par Will have f/u with derm in [place] on 7/24/19. \par FL biopsy proven, left inguinal node, documented in 5/2017.\par Feels well, no constitutional c/o. \par CBC today is wnl\par Beta-2 MG on 3/14/2019 was 2.1 and the LDH was 192. \par \par

## 2019-11-13 ENCOUNTER — OUTPATIENT (OUTPATIENT)
Dept: OUTPATIENT SERVICES | Facility: HOSPITAL | Age: 84
LOS: 1 days | Discharge: HOME | End: 2019-11-13

## 2019-11-13 DIAGNOSIS — C85.88 OTHER SPECIFIED TYPES OF NON-HODGKIN LYMPHOMA, LYMPH NODES OF MULTIPLE SITES: ICD-10-CM

## 2019-11-18 ENCOUNTER — APPOINTMENT (OUTPATIENT)
Dept: HEMATOLOGY ONCOLOGY | Facility: CLINIC | Age: 84
End: 2019-11-18
Payer: MEDICARE

## 2019-11-18 ENCOUNTER — RESULT REVIEW (OUTPATIENT)
Age: 84
End: 2019-11-18

## 2019-11-18 VITALS
TEMPERATURE: 98.2 F | SYSTOLIC BLOOD PRESSURE: 159 MMHG | BODY MASS INDEX: 23.63 KG/M2 | HEART RATE: 110 BPM | DIASTOLIC BLOOD PRESSURE: 91 MMHG | RESPIRATION RATE: 18 BRPM | WEIGHT: 126.77 LBS | OXYGEN SATURATION: 98 %

## 2019-11-18 DIAGNOSIS — Z78.9 OTHER SPECIFIED HEALTH STATUS: ICD-10-CM

## 2019-11-18 LAB
BASOPHILS # BLD AUTO: 0.1 K/UL — SIGNIFICANT CHANGE UP (ref 0–0.2)
BASOPHILS NFR BLD AUTO: 0.7 % — SIGNIFICANT CHANGE UP (ref 0–2)
EOSINOPHIL # BLD AUTO: 0 K/UL — SIGNIFICANT CHANGE UP (ref 0–0.5)
EOSINOPHIL NFR BLD AUTO: 0.3 % — SIGNIFICANT CHANGE UP (ref 0–6)
HCT VFR BLD CALC: 45.3 % — HIGH (ref 34.5–45)
HGB BLD-MCNC: 15.2 G/DL — SIGNIFICANT CHANGE UP (ref 11.5–15.5)
LYMPHOCYTES # BLD AUTO: 1.5 K/UL — SIGNIFICANT CHANGE UP (ref 1–3.3)
LYMPHOCYTES # BLD AUTO: 19.3 % — SIGNIFICANT CHANGE UP (ref 13–44)
MCHC RBC-ENTMCNC: 31.6 PG — SIGNIFICANT CHANGE UP (ref 27–34)
MCHC RBC-ENTMCNC: 33.6 G/DL — SIGNIFICANT CHANGE UP (ref 32–36)
MCV RBC AUTO: 94 FL — SIGNIFICANT CHANGE UP (ref 80–100)
MONOCYTES # BLD AUTO: 1 K/UL — HIGH (ref 0–0.9)
MONOCYTES NFR BLD AUTO: 12.5 % — SIGNIFICANT CHANGE UP (ref 2–14)
NEUTROPHILS # BLD AUTO: 5.2 K/UL — SIGNIFICANT CHANGE UP (ref 1.8–7.4)
NEUTROPHILS NFR BLD AUTO: 67.2 % — SIGNIFICANT CHANGE UP (ref 43–77)
PLATELET # BLD AUTO: 258 K/UL — SIGNIFICANT CHANGE UP (ref 150–400)
RBC # BLD: 4.82 M/UL — SIGNIFICANT CHANGE UP (ref 3.8–5.2)
RBC # FLD: 11.7 % — SIGNIFICANT CHANGE UP (ref 10.3–14.5)
WBC # BLD: 7.7 K/UL — SIGNIFICANT CHANGE UP (ref 3.8–10.5)
WBC # FLD AUTO: 7.7 K/UL — SIGNIFICANT CHANGE UP (ref 3.8–10.5)

## 2019-11-18 PROCEDURE — 99213 OFFICE O/P EST LOW 20 MIN: CPT

## 2019-11-18 NOTE — ADDENDUM
[FreeTextEntry1] : Documented by Lillie Osman acting as a scribe for Dr. Jean Claude Osullivan on 11/18/2019\par \par All medical record entries made by a scribe were at my, Dr. Jean Claude Osullivan direction and personally dictated by me on 11/18/2019 . I have reviewed the chart and agree that the record accurately reflects my personal performance of the history, physical exam, procedure and imaging.\par

## 2019-11-18 NOTE — PHYSICAL EXAM
[Fully active, able to carry on all pre-disease performance without restriction] : Status 0 - Fully active, able to carry on all pre-disease performance without restriction [Normal] : affect appropriate [de-identified] : LE venous  [de-identified] : medial left inguinal node is no larger than on last exam maybe somewhat smaller, there is no significant adenopathy on the right \par \par medial left inguinal node is no larger than on last exam maybe somewhat smaller, there is no significant adenopathy on the right \par medial left inguinal node is no larger than on last exam maybe somewhat smaller, there is no significant adenopathy on the right \par  [de-identified] : no CVAT [de-identified] : Confluent adenopathy in the 1-2 cm range left inguinal region also small nodes right inguinal. About 2 cm left femoral node. [de-identified] : several seborrheic keratoses

## 2019-11-18 NOTE — HISTORY OF PRESENT ILLNESS
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  She was first diagnosed in July, 1995; pathology a read at [facility] [place] as follicular small cleaved cell with focal diffuse areas. .She receive RT to an axillary area of disease early in her course, and then developed significant  adenopathy in 2006; beginning in April, 2006 she received rituximab weekly x 4 with an excellent response.  Upon progression in Feb., 2010, rebiopsy showed FL, gr 1-2.  She received rituximab weekly x 4 followed by two years of rituximab maintenance using weekly x 4, q6 month schedule.  She had an excellent response.  In Dec., 2010 she was hospitalized in [facility]. with a CAP. She was last imaged in May, 2012: she had no evidence of lymphoma.  She had a stable left adnexal dermoid.    [FreeTextEntry1] : s/p rituximab and rituximab maintenance [de-identified] : Recurrent FL, left inguinal node. \par Last imaging in 2017 showed bilateral inguinal adenopathy. Has left femoral node, unchanged, Non-tender. Saw Dermatology in [place] on 7/24/19. Had BCC treated with RT. Completed end of 9/2019.\par FL biopsy proven, left inguinal node, documented in 5/2017.\par Feels well, no constitutional c/o. \par CBC remains wnl.\par PET/CT from 4/2017: showed the inguinal adenopathy. She is unaware of growths in her nodes or new nodes. \par Beta-2 MG on 3/14/2019 was 2.1 and the LDH was 192. \par \par \par \par

## 2020-05-11 ENCOUNTER — APPOINTMENT (OUTPATIENT)
Dept: HEMATOLOGY ONCOLOGY | Facility: CLINIC | Age: 85
End: 2020-05-11

## 2020-05-18 ENCOUNTER — APPOINTMENT (OUTPATIENT)
Dept: HEMATOLOGY ONCOLOGY | Facility: CLINIC | Age: 85
End: 2020-05-18

## 2020-07-16 LAB
ALBUMIN SERPL ELPH-MCNC: 4.6 G/DL
ALP BLD-CCNC: 137 U/L
ALT SERPL-CCNC: 25 U/L
ANION GAP SERPL CALC-SCNC: 14 MMOL/L
AST SERPL-CCNC: 32 U/L
B2 MICROGLOB SERPL-MCNC: 2 MG/L
BILIRUB SERPL-MCNC: 0.4 MG/DL
BUN SERPL-MCNC: 10 MG/DL
CALCIUM SERPL-MCNC: 10.1 MG/DL
CHLORIDE SERPL-SCNC: 104 MMOL/L
CO2 SERPL-SCNC: 23 MMOL/L
CREAT SERPL-MCNC: 0.83 MG/DL
GLUCOSE SERPL-MCNC: 110 MG/DL
LDH SERPL-CCNC: 204 U/L
POTASSIUM SERPL-SCNC: 4.2 MMOL/L
PROT SERPL-MCNC: 6.7 G/DL
SODIUM SERPL-SCNC: 141 MMOL/L

## 2020-12-29 ENCOUNTER — APPOINTMENT (OUTPATIENT)
Dept: HEMATOLOGY ONCOLOGY | Facility: CLINIC | Age: 85
End: 2020-12-29

## 2021-09-29 ENCOUNTER — OUTPATIENT (OUTPATIENT)
Dept: OUTPATIENT SERVICES | Facility: HOSPITAL | Age: 86
LOS: 1 days | Discharge: HOME | End: 2021-09-29

## 2021-09-29 DIAGNOSIS — C85.88 OTHER SPECIFIED TYPES OF NON-HODGKIN LYMPHOMA, LYMPH NODES OF MULTIPLE SITES: ICD-10-CM

## 2021-09-30 ENCOUNTER — RESULT REVIEW (OUTPATIENT)
Age: 86
End: 2021-09-30

## 2021-09-30 ENCOUNTER — APPOINTMENT (OUTPATIENT)
Dept: HEMATOLOGY ONCOLOGY | Facility: CLINIC | Age: 86
End: 2021-09-30
Payer: MEDICARE

## 2021-09-30 VITALS
SYSTOLIC BLOOD PRESSURE: 162 MMHG | TEMPERATURE: 98.4 F | WEIGHT: 125 LBS | RESPIRATION RATE: 18 BRPM | DIASTOLIC BLOOD PRESSURE: 96 MMHG | HEART RATE: 107 BPM | OXYGEN SATURATION: 99 % | BODY MASS INDEX: 23.3 KG/M2 | HEIGHT: 61.5 IN

## 2021-09-30 LAB
BASOPHILS # BLD AUTO: 0.07 K/UL — SIGNIFICANT CHANGE UP (ref 0–0.2)
BASOPHILS NFR BLD AUTO: 1 % — SIGNIFICANT CHANGE UP (ref 0–2)
EOSINOPHIL # BLD AUTO: 0.01 K/UL — SIGNIFICANT CHANGE UP (ref 0–0.5)
EOSINOPHIL NFR BLD AUTO: 0.1 % — SIGNIFICANT CHANGE UP (ref 0–6)
HCT VFR BLD CALC: 45.1 % — HIGH (ref 34.5–45)
HGB BLD-MCNC: 14.9 G/DL — SIGNIFICANT CHANGE UP (ref 11.5–15.5)
IMM GRANULOCYTES NFR BLD AUTO: 0.4 % — SIGNIFICANT CHANGE UP (ref 0–1.5)
LYMPHOCYTES # BLD AUTO: 0.95 K/UL — LOW (ref 1–3.3)
LYMPHOCYTES # BLD AUTO: 13 % — SIGNIFICANT CHANGE UP (ref 13–44)
MCHC RBC-ENTMCNC: 30.2 PG — SIGNIFICANT CHANGE UP (ref 27–34)
MCHC RBC-ENTMCNC: 33 G/DL — SIGNIFICANT CHANGE UP (ref 32–36)
MCV RBC AUTO: 91.3 FL — SIGNIFICANT CHANGE UP (ref 80–100)
MONOCYTES # BLD AUTO: 0.85 K/UL — SIGNIFICANT CHANGE UP (ref 0–0.9)
MONOCYTES NFR BLD AUTO: 11.7 % — SIGNIFICANT CHANGE UP (ref 2–14)
NEUTROPHILS # BLD AUTO: 5.37 K/UL — SIGNIFICANT CHANGE UP (ref 1.8–7.4)
NEUTROPHILS NFR BLD AUTO: 73.8 % — SIGNIFICANT CHANGE UP (ref 43–77)
NRBC # BLD: 0 /100 WBCS — SIGNIFICANT CHANGE UP (ref 0–0)
PLATELET # BLD AUTO: 238 K/UL — SIGNIFICANT CHANGE UP (ref 150–400)
RBC # BLD: 4.94 M/UL — SIGNIFICANT CHANGE UP (ref 3.8–5.2)
RBC # FLD: 12.6 % — SIGNIFICANT CHANGE UP (ref 10.3–14.5)
WBC # BLD: 7.28 K/UL — SIGNIFICANT CHANGE UP (ref 3.8–10.5)
WBC # FLD AUTO: 7.28 K/UL — SIGNIFICANT CHANGE UP (ref 3.8–10.5)

## 2021-09-30 PROCEDURE — 99214 OFFICE O/P EST MOD 30 MIN: CPT

## 2021-10-03 NOTE — HISTORY OF PRESENT ILLNESS
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  She was first diagnosed in July, 1995; pathology a read at [facility] as follicular small cleaved cell with focal diffuse areas. .She receive RT to an axillary area of disease early in her course, and then developed significant  adenopathy in 2006; beginning in April, 2006 she received rituximab weekly x 4 with an excellent response.  Upon progression in Feb., 2010, rebiopsy showed FL, gr 1-2.  She received rituximab weekly x 4 followed by two years of rituximab maintenance using weekly x 4, q6 month schedule.  She had an excellent response.  In Dec., 2010 she was hospitalized in [facility]. with a CAP. She was last imaged in May, 2012: she had no evidence of lymphoma.  She had a stable left adnexal dermoid.    [FreeTextEntry1] : s/p rituximab and rituximab maintenance [de-identified] : Recurrent FL, left inguinal node. \par Last imaging in 2017 showed bilateral inguinal adenopathy. Has left femoral node, unchanged, Non-tender. Saw Dermatology in [place] on 7/24/19. Had BCC treated with RT. Completed end of 9/2019.\par FL biopsy proven, left inguinal node, documented in 5/2017.\par Feels well, no constitutional c/o. \par CBC remains wnl.\par PET/CT from 4/2017: showed the inguinal adenopathy. She is unaware of growths in her nodes or new nodes. \par Beta-2 MG on 3/14/2019 was 2.1 and the LDH was 192. \par \par \par \par

## 2021-10-03 NOTE — REASON FOR VISIT
[Follow-Up Visit] : a follow-up visit for [Lymphoma] : lymphoma [FreeTextEntry2] : Follicular Lymphoma

## 2021-10-03 NOTE — ASSESSMENT
[Palliative] : Goals of care discussed with patient: Palliative [Palliative Care Plan] : not applicable at this time [FreeTextEntry1] : History of FL originally dating to 1995, initially tx with RT to the axilla for stage I disease. In 2006, developed significant adenopathy and received 4 weekly doses of Rituxan with excellent response. Bx on 2010 showed FL, gr 1-2 after which she again received 4 weekly doses of Rituxan followed by 2 years of Rituxan maintenance. She again remained in remission until 2017 at which time she developed isolated adenopathy in left inguinal region which again bx proved to be FL. Since then she has been followed without evidence of progression until now. Palpable adenopathy seems stable on exam but pt concerned that there has been growth since last visit\par She has received the influenza and pneumonia vaccine. Both Covid vaccines received.\par check CMP, LDH, beta-2 microglobulin\par Will assess EOD with f/u PET/CT scan\par H/o squamous cell carcinoma of skin which was significant. Recently had BCC of the noses treated with RT. Continues to have regular derm follow up.\par to call post PT/CT

## 2021-10-03 NOTE — REVIEW OF SYSTEMS
[Negative] : Allergic/Immunologic [Easy Bleeding] : no tendency for easy bleeding [Easy Bruising] : no tendency for easy bruising [Swollen Glands] : swollen glands [de-identified] : more aware of inguinal, femoral LAD

## 2021-10-03 NOTE — PHYSICAL EXAM
[Fully active, able to carry on all pre-disease performance without restriction] : Status 0 - Fully active, able to carry on all pre-disease performance without restriction [Normal] : affect appropriate [de-identified] : LE venous  [de-identified] : medial left inguinal node is no larger than on last exam maybe somewhat smaller, there is no significant adenopathy on the right \par \par medial left inguinal node is no larger than on last exam maybe somewhat smaller, there is no significant adenopathy on the right \par medial left inguinal node is no larger than on last exam maybe somewhat smaller, there is no significant adenopathy on the right \par  [de-identified] : no CVAT [de-identified] : Confluent adenopathy in the 1-2 cm range left inguinal region also small nodes right inguinal. About 2 cm left femoral node. [de-identified] : several seborrheic keratoses

## 2021-10-04 ENCOUNTER — RESULT REVIEW (OUTPATIENT)
Age: 86
End: 2021-10-04

## 2021-10-05 ENCOUNTER — APPOINTMENT (OUTPATIENT)
Dept: NUCLEAR MEDICINE | Facility: IMAGING CENTER | Age: 86
End: 2021-10-05
Payer: MEDICARE

## 2021-10-05 ENCOUNTER — OUTPATIENT (OUTPATIENT)
Dept: OUTPATIENT SERVICES | Facility: HOSPITAL | Age: 86
LOS: 1 days | End: 2021-10-05
Payer: MEDICARE

## 2021-10-05 DIAGNOSIS — C82.90 FOLLICULAR LYMPHOMA, UNSPECIFIED, UNSPECIFIED SITE: ICD-10-CM

## 2021-10-05 PROCEDURE — 78815 PET IMAGE W/CT SKULL-THIGH: CPT

## 2021-10-05 PROCEDURE — 78815 PET IMAGE W/CT SKULL-THIGH: CPT | Mod: 26,PI,MH

## 2021-10-05 PROCEDURE — A9552: CPT

## 2021-10-10 ENCOUNTER — TRANSCRIPTION ENCOUNTER (OUTPATIENT)
Age: 86
End: 2021-10-10

## 2022-05-31 ENCOUNTER — OUTPATIENT (OUTPATIENT)
Dept: OUTPATIENT SERVICES | Facility: HOSPITAL | Age: 87
LOS: 1 days | Discharge: HOME | End: 2022-05-31

## 2022-05-31 DIAGNOSIS — C85.88 OTHER SPECIFIED TYPES OF NON-HODGKIN LYMPHOMA, LYMPH NODES OF MULTIPLE SITES: ICD-10-CM

## 2022-06-06 ENCOUNTER — RESULT REVIEW (OUTPATIENT)
Age: 87
End: 2022-06-06

## 2022-06-06 ENCOUNTER — APPOINTMENT (OUTPATIENT)
Dept: HEMATOLOGY ONCOLOGY | Facility: CLINIC | Age: 87
End: 2022-06-06
Payer: MEDICARE

## 2022-06-06 VITALS
HEIGHT: 61.5 IN | HEART RATE: 116 BPM | BODY MASS INDEX: 23.09 KG/M2 | RESPIRATION RATE: 16 BRPM | SYSTOLIC BLOOD PRESSURE: 151 MMHG | DIASTOLIC BLOOD PRESSURE: 91 MMHG | TEMPERATURE: 97.8 F | OXYGEN SATURATION: 98 % | WEIGHT: 123.9 LBS

## 2022-06-06 LAB
ALBUMIN SERPL ELPH-MCNC: 4.4 G/DL
ALBUMIN SERPL ELPH-MCNC: 4.7 G/DL
ALP BLD-CCNC: 127 U/L
ALP BLD-CCNC: 154 U/L
ALT SERPL-CCNC: 16 U/L
ALT SERPL-CCNC: 20 U/L
ANION GAP SERPL CALC-SCNC: 12 MMOL/L
ANION GAP SERPL CALC-SCNC: 14 MMOL/L
AST SERPL-CCNC: 24 U/L
AST SERPL-CCNC: 27 U/L
B2 MICROGLOB SERPL-MCNC: 1.9 MG/L
BASOPHILS # BLD AUTO: 0.08 K/UL — SIGNIFICANT CHANGE UP (ref 0–0.2)
BASOPHILS NFR BLD AUTO: 1.3 % — SIGNIFICANT CHANGE UP (ref 0–2)
BILIRUB SERPL-MCNC: 0.4 MG/DL
BILIRUB SERPL-MCNC: 0.4 MG/DL
BUN SERPL-MCNC: 11 MG/DL
BUN SERPL-MCNC: 8 MG/DL
CALCIUM SERPL-MCNC: 10.2 MG/DL
CALCIUM SERPL-MCNC: 9.9 MG/DL
CHLORIDE SERPL-SCNC: 100 MMOL/L
CHLORIDE SERPL-SCNC: 105 MMOL/L
CO2 SERPL-SCNC: 24 MMOL/L
CO2 SERPL-SCNC: 25 MMOL/L
CREAT SERPL-MCNC: 0.69 MG/DL
CREAT SERPL-MCNC: 0.78 MG/DL
DEPRECATED KAPPA LC FREE/LAMBDA SER: 1.04 RATIO
DEPRECATED KAPPA LC FREE/LAMBDA SER: 1.06 RATIO
EGFR: 73 ML/MIN/1.73M2
EOSINOPHIL # BLD AUTO: 0.02 K/UL — SIGNIFICANT CHANGE UP (ref 0–0.5)
EOSINOPHIL NFR BLD AUTO: 0.3 % — SIGNIFICANT CHANGE UP (ref 0–6)
GLUCOSE SERPL-MCNC: 120 MG/DL
GLUCOSE SERPL-MCNC: 134 MG/DL
HCT VFR BLD CALC: 45.3 % — HIGH (ref 34.5–45)
HGB BLD-MCNC: 14.9 G/DL — SIGNIFICANT CHANGE UP (ref 11.5–15.5)
IGA SER QL IEP: 68 MG/DL
IGA SER QL IEP: 77 MG/DL
IGG SER QL IEP: 647 MG/DL
IGG SER QL IEP: 656 MG/DL
IGM SER QL IEP: 23 MG/DL
IGM SER QL IEP: 29 MG/DL
IMM GRANULOCYTES NFR BLD AUTO: 0.5 % — SIGNIFICANT CHANGE UP (ref 0–1.5)
KAPPA LC CSF-MCNC: 0.95 MG/DL
KAPPA LC CSF-MCNC: 1.15 MG/DL
KAPPA LC SERPL-MCNC: 0.99 MG/DL
KAPPA LC SERPL-MCNC: 1.22 MG/DL
LDH SERPL-CCNC: 210 U/L
LDH SERPL-CCNC: 215 U/L
LYMPHOCYTES # BLD AUTO: 1.26 K/UL — SIGNIFICANT CHANGE UP (ref 1–3.3)
LYMPHOCYTES # BLD AUTO: 20.5 % — SIGNIFICANT CHANGE UP (ref 13–44)
MCHC RBC-ENTMCNC: 30.3 PG — SIGNIFICANT CHANGE UP (ref 27–34)
MCHC RBC-ENTMCNC: 32.9 G/DL — SIGNIFICANT CHANGE UP (ref 32–36)
MCV RBC AUTO: 92.3 FL — SIGNIFICANT CHANGE UP (ref 80–100)
MONOCYTES # BLD AUTO: 0.79 K/UL — SIGNIFICANT CHANGE UP (ref 0–0.9)
MONOCYTES NFR BLD AUTO: 12.8 % — SIGNIFICANT CHANGE UP (ref 2–14)
NEUTROPHILS # BLD AUTO: 3.97 K/UL — SIGNIFICANT CHANGE UP (ref 1.8–7.4)
NEUTROPHILS NFR BLD AUTO: 64.6 % — SIGNIFICANT CHANGE UP (ref 43–77)
NRBC # BLD: 0 /100 WBCS — SIGNIFICANT CHANGE UP (ref 0–0)
PLATELET # BLD AUTO: 257 K/UL — SIGNIFICANT CHANGE UP (ref 150–400)
POTASSIUM SERPL-SCNC: 4.1 MMOL/L
POTASSIUM SERPL-SCNC: 4.3 MMOL/L
PROT SERPL-MCNC: 6.6 G/DL
PROT SERPL-MCNC: 6.7 G/DL
RBC # BLD: 4.91 M/UL — SIGNIFICANT CHANGE UP (ref 3.8–5.2)
RBC # FLD: 12.5 % — SIGNIFICANT CHANGE UP (ref 10.3–14.5)
SODIUM SERPL-SCNC: 139 MMOL/L
SODIUM SERPL-SCNC: 142 MMOL/L
WBC # BLD: 6.15 K/UL — SIGNIFICANT CHANGE UP (ref 3.8–10.5)
WBC # FLD AUTO: 6.15 K/UL — SIGNIFICANT CHANGE UP (ref 3.8–10.5)

## 2022-06-06 PROCEDURE — 99214 OFFICE O/P EST MOD 30 MIN: CPT

## 2022-06-07 ENCOUNTER — RESULT REVIEW (OUTPATIENT)
Age: 87
End: 2022-06-07

## 2022-06-07 NOTE — REVIEW OF SYSTEMS
[Swollen Glands] : swollen glands [Negative] : Allergic/Immunologic [Easy Bleeding] : no tendency for easy bleeding [Easy Bruising] : no tendency for easy bruising [de-identified] : more aware of inguinal, femoral LAD, increased discomfort

## 2022-06-07 NOTE — HISTORY OF PRESENT ILLNESS
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  She was first diagnosed in July, 1995; pathology a read at [facility] [place] as follicular small cleaved cell with focal diffuse areas. .She receive RT to an axillary area of disease early in her course, and then developed significant  adenopathy in 2006; beginning in April, 2006 she received rituximab weekly x 4 with an excellent response.  Upon progression in Feb., 2010, rebiopsy showed FL, gr 1-2.  She received rituximab weekly x 4 followed by two years of rituximab maintenance using weekly x 4, q6 month schedule.  She had an excellent response.  In Dec., 2010 she was hospitalized in [facility]. with a CAP. She was last imaged in May, 2012: she had no evidence of lymphoma.  She had a stable left adnexal dermoid.    [FreeTextEntry1] : s/p rituximab and rituximab maintenance [de-identified] : Recurrent FL, left femoral node, palpable, visible\par \par recurrent FL biopsy proven, left inguinal node, documented in 5/2017.\par \par Feels well, no constitutional c/o. \par CBC remains wnl.\par PET/CT from 10/2021- incr size, FDG avidity left inguinofemoral node (SUV 10.1), 2.7 cm left  inguinal node (SUV 6.3)\par pt c/o incr discomfort from the larger node\par  . \par \par \par \par

## 2022-06-07 NOTE — PHYSICAL EXAM
[Fully active, able to carry on all pre-disease performance without restriction] : Status 0 - Fully active, able to carry on all pre-disease performance without restriction [Normal] : normal spine exam without palpable tenderness, no kyphosis or scoliosis [de-identified] : LE venous  [de-identified] : \par \par medial left inguinal node is no larger than on last exam maybe somewhat smaller, there is no significant adenopathy on the right \par medial left inguinal node is no larger than on last exam maybe somewhat smaller, there is no significant adenopathy on the right \par  [de-identified] : no CVAT [de-identified] : left femoral node larger, more protuberant, max 7+ cm, NT

## 2022-06-07 NOTE — ASSESSMENT
[Palliative] : Goals of care discussed with patient: Palliative [Palliative Care Plan] : not applicable at this time [FreeTextEntry1] : History of FL originally dating to 1995, initially tx with RT to the axilla for stage I disease. In 2006, developed significant adenopathy and received 4 weekly doses of Rituxan with excellent response. Bx on 2010 showed FL, gr 1-2 after which she again received 4 weekly doses of Rituxan followed by 2 years of Rituxan maintenance. She again remained in remission until 2017 at which time she developed isolated adenopathy in left inguinal region which again bx proved to be FL. Since then she has been followed without evidence of progression until now. Incr size of left femoral nodes on exam, causing discomfort\par CBC results reviewed and d/w pt\par WBC 6.15, Hgb 14.9, Plt 257K, nl diff\par LN rebx deferred by Jae at [facility] [place], [place] - with further incr size of node,\par will have it bx'd under U/[facility] guidance\par She has received the influenza and pneumonia vaccine. Both Covid vaccines received.and booster\par check CMP, LDH, beta-2 microglobulin\par  \par H/o squamous cell carcinoma of skin which was significant. h/o  BCC of the nose treated with RT. \par Continues to have regular derm follow up.\par to call post PT/CT

## 2022-06-10 ENCOUNTER — RESULT REVIEW (OUTPATIENT)
Age: 87
End: 2022-06-10

## 2022-06-10 ENCOUNTER — APPOINTMENT (OUTPATIENT)
Dept: ULTRASOUND IMAGING | Facility: IMAGING CENTER | Age: 87
End: 2022-06-10
Payer: MEDICARE

## 2022-06-10 ENCOUNTER — OUTPATIENT (OUTPATIENT)
Dept: OUTPATIENT SERVICES | Facility: HOSPITAL | Age: 87
LOS: 1 days | End: 2022-06-10
Payer: MEDICARE

## 2022-06-10 DIAGNOSIS — C82.90 FOLLICULAR LYMPHOMA, UNSPECIFIED, UNSPECIFIED SITE: ICD-10-CM

## 2022-06-10 PROCEDURE — 88189 FLOWCYTOMETRY/READ 16 & >: CPT

## 2022-06-10 PROCEDURE — 88360 TUMOR IMMUNOHISTOCHEM/MANUAL: CPT

## 2022-06-10 PROCEDURE — 88185 FLOWCYTOMETRY/TC ADD-ON: CPT

## 2022-06-10 PROCEDURE — 88172 CYTP DX EVAL FNA 1ST EA SITE: CPT

## 2022-06-10 PROCEDURE — 88341 IMHCHEM/IMCYTCHM EA ADD ANTB: CPT | Mod: 26,59

## 2022-06-10 PROCEDURE — 88305 TISSUE EXAM BY PATHOLOGIST: CPT

## 2022-06-10 PROCEDURE — 88360 TUMOR IMMUNOHISTOCHEM/MANUAL: CPT | Mod: 26

## 2022-06-10 PROCEDURE — 76942 ECHO GUIDE FOR BIOPSY: CPT | Mod: 26

## 2022-06-10 PROCEDURE — 88173 CYTOPATH EVAL FNA REPORT: CPT

## 2022-06-10 PROCEDURE — 88341 IMHCHEM/IMCYTCHM EA ADD ANTB: CPT

## 2022-06-10 PROCEDURE — 87205 SMEAR GRAM STAIN: CPT

## 2022-06-10 PROCEDURE — 88173 CYTOPATH EVAL FNA REPORT: CPT | Mod: 26

## 2022-06-10 PROCEDURE — 88305 TISSUE EXAM BY PATHOLOGIST: CPT | Mod: 26

## 2022-06-10 PROCEDURE — 88342 IMHCHEM/IMCYTCHM 1ST ANTB: CPT | Mod: 26,59

## 2022-06-10 PROCEDURE — 38505 NEEDLE BIOPSY LYMPH NODES: CPT

## 2022-06-10 PROCEDURE — 76942 ECHO GUIDE FOR BIOPSY: CPT

## 2022-06-10 PROCEDURE — 88342 IMHCHEM/IMCYTCHM 1ST ANTB: CPT

## 2022-06-10 PROCEDURE — 38505 NEEDLE BIOPSY LYMPH NODES: CPT | Mod: LT

## 2022-06-16 LAB — NON-GYNECOLOGICAL CYTOLOGY STUDY: SIGNIFICANT CHANGE UP

## 2023-02-23 ENCOUNTER — OUTPATIENT (OUTPATIENT)
Dept: OUTPATIENT SERVICES | Facility: HOSPITAL | Age: 88
LOS: 1 days | Discharge: HOME | End: 2023-02-23

## 2023-02-23 DIAGNOSIS — C85.88 OTHER SPECIFIED TYPES OF NON-HODGKIN LYMPHOMA, LYMPH NODES OF MULTIPLE SITES: ICD-10-CM

## 2023-03-02 ENCOUNTER — RESULT REVIEW (OUTPATIENT)
Age: 88
End: 2023-03-02

## 2023-03-02 ENCOUNTER — APPOINTMENT (OUTPATIENT)
Dept: HEMATOLOGY ONCOLOGY | Facility: CLINIC | Age: 88
End: 2023-03-02
Payer: MEDICARE

## 2023-03-02 VITALS
HEART RATE: 132 BPM | HEIGHT: 61.5 IN | OXYGEN SATURATION: 99 % | BODY MASS INDEX: 22.85 KG/M2 | RESPIRATION RATE: 20 BRPM | TEMPERATURE: 96.9 F | WEIGHT: 122.58 LBS

## 2023-03-02 DIAGNOSIS — Z92.21 PERSONAL HISTORY OF ANTINEOPLASTIC CHEMOTHERAPY: ICD-10-CM

## 2023-03-02 DIAGNOSIS — Z92.3 PERSONAL HISTORY OF IRRADIATION: ICD-10-CM

## 2023-03-02 DIAGNOSIS — Z92.89 PERSONAL HISTORY OF OTHER MEDICAL TREATMENT: ICD-10-CM

## 2023-03-02 DIAGNOSIS — Z82.0 FAMILY HISTORY OF EPILEPSY AND OTHER DISEASES OF THE NERVOUS SYSTEM: ICD-10-CM

## 2023-03-02 DIAGNOSIS — Z80.41 FAMILY HISTORY OF MALIGNANT NEOPLASM OF OVARY: ICD-10-CM

## 2023-03-02 DIAGNOSIS — Z80.3 FAMILY HISTORY OF MALIGNANT NEOPLASM OF BREAST: ICD-10-CM

## 2023-03-02 DIAGNOSIS — Z85.828 PERSONAL HISTORY OF OTHER MALIGNANT NEOPLASM OF SKIN: ICD-10-CM

## 2023-03-02 DIAGNOSIS — R59.9 ENLARGED LYMPH NODES, UNSPECIFIED: ICD-10-CM

## 2023-03-02 DIAGNOSIS — Z85.9 OTHER SPECIFIED POSTPROCEDURAL STATES: ICD-10-CM

## 2023-03-02 DIAGNOSIS — J18.9 PNEUMONIA, UNSPECIFIED ORGANISM: ICD-10-CM

## 2023-03-02 DIAGNOSIS — Z98.890 OTHER SPECIFIED POSTPROCEDURAL STATES: ICD-10-CM

## 2023-03-02 DIAGNOSIS — C82.90 FOLLICULAR LYMPHOMA, UNSPECIFIED, UNSPECIFIED SITE: ICD-10-CM

## 2023-03-02 LAB
ALBUMIN SERPL ELPH-MCNC: 4.6 G/DL
ALP BLD-CCNC: 128 U/L
ALT SERPL-CCNC: 14 U/L
ANION GAP SERPL CALC-SCNC: 12 MMOL/L
AST SERPL-CCNC: 24 U/L
BASOPHILS # BLD AUTO: 0.05 K/UL — SIGNIFICANT CHANGE UP (ref 0–0.2)
BASOPHILS NFR BLD AUTO: 0.8 % — SIGNIFICANT CHANGE UP (ref 0–2)
BILIRUB SERPL-MCNC: 0.3 MG/DL
BUN SERPL-MCNC: 9 MG/DL
CALCIUM SERPL-MCNC: 10.3 MG/DL
CHLORIDE SERPL-SCNC: 104 MMOL/L
CO2 SERPL-SCNC: 25 MMOL/L
CREAT SERPL-MCNC: 0.82 MG/DL
DEPRECATED KAPPA LC FREE/LAMBDA SER: 1.36 RATIO
EGFR: 68 ML/MIN/1.73M2
EOSINOPHIL # BLD AUTO: 0.02 K/UL — SIGNIFICANT CHANGE UP (ref 0–0.5)
EOSINOPHIL NFR BLD AUTO: 0.3 % — SIGNIFICANT CHANGE UP (ref 0–6)
GLUCOSE SERPL-MCNC: 123 MG/DL
HCT VFR BLD CALC: 43.8 % — SIGNIFICANT CHANGE UP (ref 34.5–45)
HGB BLD-MCNC: 14.6 G/DL — SIGNIFICANT CHANGE UP (ref 11.5–15.5)
IGA SER QL IEP: 44 MG/DL
IGG SER QL IEP: 524 MG/DL
IGM SER QL IEP: 16 MG/DL
IMM GRANULOCYTES NFR BLD AUTO: 0.3 % — SIGNIFICANT CHANGE UP (ref 0–0.9)
KAPPA LC CSF-MCNC: 0.74 MG/DL
KAPPA LC SERPL-MCNC: 1.01 MG/DL
LDH SERPL-CCNC: 188 U/L
LYMPHOCYTES # BLD AUTO: 1.21 K/UL — SIGNIFICANT CHANGE UP (ref 1–3.3)
LYMPHOCYTES # BLD AUTO: 20.3 % — SIGNIFICANT CHANGE UP (ref 13–44)
MCHC RBC-ENTMCNC: 30.4 PG — SIGNIFICANT CHANGE UP (ref 27–34)
MCHC RBC-ENTMCNC: 33.3 G/DL — SIGNIFICANT CHANGE UP (ref 32–36)
MCV RBC AUTO: 91.3 FL — SIGNIFICANT CHANGE UP (ref 80–100)
MONOCYTES # BLD AUTO: 0.77 K/UL — SIGNIFICANT CHANGE UP (ref 0–0.9)
MONOCYTES NFR BLD AUTO: 12.9 % — SIGNIFICANT CHANGE UP (ref 2–14)
NEUTROPHILS # BLD AUTO: 3.89 K/UL — SIGNIFICANT CHANGE UP (ref 1.8–7.4)
NEUTROPHILS NFR BLD AUTO: 65.4 % — SIGNIFICANT CHANGE UP (ref 43–77)
NRBC # BLD: 0 /100 WBCS — SIGNIFICANT CHANGE UP (ref 0–0)
PLATELET # BLD AUTO: 228 K/UL — SIGNIFICANT CHANGE UP (ref 150–400)
POTASSIUM SERPL-SCNC: 4.5 MMOL/L
PROT SERPL-MCNC: 6.7 G/DL
RBC # BLD: 4.8 M/UL — SIGNIFICANT CHANGE UP (ref 3.8–5.2)
RBC # FLD: 13 % — SIGNIFICANT CHANGE UP (ref 10.3–14.5)
SODIUM SERPL-SCNC: 141 MMOL/L
WBC # BLD: 5.96 K/UL — SIGNIFICANT CHANGE UP (ref 3.8–10.5)
WBC # FLD AUTO: 5.96 K/UL — SIGNIFICANT CHANGE UP (ref 3.8–10.5)

## 2023-03-02 PROCEDURE — 99213 OFFICE O/P EST LOW 20 MIN: CPT

## 2023-03-03 PROBLEM — Z98.890 HISTORY OF SQUAMOUS CELL CARCINOMA EXCISION: Status: ACTIVE | Noted: 2023-03-03

## 2023-03-03 PROBLEM — R59.9 LYMPH NODE ENLARGEMENT: Status: ACTIVE | Noted: 2017-04-10

## 2023-03-03 PROBLEM — Z85.828 HISTORY OF BASAL CELL CARCINOMA (BCC) OF SKIN: Status: ACTIVE | Noted: 2023-03-03

## 2023-03-03 RX ORDER — AZITHROMYCIN 250 MG/1
250 TABLET, FILM COATED ORAL
Qty: 6 | Refills: 0 | Status: DISCONTINUED | COMMUNITY
Start: 2022-05-31 | End: 2023-03-03

## 2023-03-03 NOTE — REVIEW OF SYSTEMS
[Joint Pain] : joint pain [Joint Stiffness] : joint stiffness [Swollen Glands] : swollen glands [Negative] : Allergic/Immunologic [Skin Rash] : no skin rash [Skin Wound] : no skin wound [de-identified] : h/o SCC skin, BCC nose, treated w/RT [Easy Bleeding] : no tendency for easy bleeding [Easy Bruising] : no tendency for easy bruising [FreeTextEntry9] : occas arthralgias hands [de-identified] : remains aware of left nguinal, femoral LAD, but no discomfort,

## 2023-03-03 NOTE — PHYSICAL EXAM
[Fully active, able to carry on all pre-disease performance without restriction] : Status 0 - Fully active, able to carry on all pre-disease performance without restriction [Normal] : affect appropriate [de-identified] : tachycardic, regular, about 100 [de-identified] : LE venous stasis changes [de-identified] : \par \par medial left inguinal node is no larger than on last exam maybe somewhat smaller, there is no significant adenopathy on the right \par medial left inguinal node is no larger than on last exam maybe somewhat smaller, there is no significant adenopathy on the right \par  [de-identified] : no CVAT [de-identified] : left femoral node smaller, no longer protuberant, approx 3.5 cm, NT

## 2023-03-03 NOTE — ASSESSMENT
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 In 2006, developed significant adenopathy and received 4 weekly doses of Rituxan with excellent response. Bx on 2010 showed FL, gr 1-2 after which she again received 4 weekly doses of Rituxan followed by 2 years of Rituxan maintenance. She remained in remission until 2017 when she developed isolated adenopathy in left inguinal region was bx proven FL. Since was followed without evidence of progression until 2022 when she developed marked Incr size of left femoral node, causing discomfort. Bx showed FL, gr 1-2. Limited EOD.\par CBC results reviewed and d/w pt\par WBC 5.96, Hgb 14.6, Plt 228K, nl diff\par Plan for RT deferred when discomfort resolved, had decr in size of femoral node\par Exam today shows significant decr in size of node\par She is asymptomatic\par Plan to continue observation\par \[facility] Does not have primary care physician; Dr. Ruby Amaay identified as a geriatrician\[facility] who works near her, contact information given to pt.\par Will cont. to see Dr. Ofe Arriaga as needed at [place] for f/u of NHL\par \par H/o resected large squamous cell carcinoma of skin, h/o  BCC of nose treated with RT. \[facility] Continues to have regular derm follow up.\par \par Check CMP, LDH, Igs\par

## 2023-03-03 NOTE — CONSULT LETTER
[DrTamie  ___] : Dr. ANDRADE [DrTamie ___] : Dr. ANDRADE [FreeTextEntry3] : Jean [facility] Osullivan M.D., [facility]\par

## 2023-03-03 NOTE — CONSULT LETTER
[DrTamie  ___] : Dr. ANDRADE [DrTamie ___] : Dr. ANDRADE [FreeTextEntry3] : [facility] Claude Osullivan M.D., [facility]\par

## 2023-03-03 NOTE — REVIEW OF SYSTEMS
[Joint Pain] : joint pain [Joint Stiffness] : joint stiffness [Swollen Glands] : swollen glands [Negative] : Allergic/Immunologic [Skin Rash] : no skin rash [Skin Wound] : no skin wound [de-identified] : h/o SCC skin, BCC nose, treated w/RT [Easy Bleeding] : no tendency for easy bleeding [Easy Bruising] : no tendency for easy bruising [FreeTextEntry9] : occas arthralgias hands [de-identified] : remains aware of left nguinal, femoral LAD, but no discomfort,

## 2023-03-03 NOTE — PHYSICAL EXAM
[Fully active, able to carry on all pre-disease performance without restriction] : Status 0 - Fully active, able to carry on all pre-disease performance without restriction [Normal] : affect appropriate [de-identified] : tachycardic, regular, about 100 [de-identified] : LE venous stasis changes [de-identified] : \par \par medial left inguinal node is no larger than on last exam maybe somewhat smaller, there is no significant adenopathy on the right \par medial left inguinal node is no larger than on last exam maybe somewhat smaller, there is no significant adenopathy on the right \par  [de-identified] : no CVAT [de-identified] : left femoral node smaller, no longer protuberant, approx 3.5 cm, NT

## 2023-03-03 NOTE — HISTORY OF PRESENT ILLNESS
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  She was first diagnosed in July, 1995; pathology a read at [facility] [place] as follicular small cleaved cell with focal diffuse areas. .She receive RT to an axillary area of disease early in her course, and then developed significant  adenopathy in 2006; beginning in April, 2006 she received rituximab weekly x 4 with an excellent response.  Upon progression in Feb., 2010, rebiopsy showed FL, gr 1-2.  She received rituximab weekly x 4 followed by two years of rituximab maintenance using weekly x 4, q6 month schedule.  She had an excellent response.  In Dec., 2010 she was hospitalized in [facility]. with a CAP. She was last imaged in May, 2012: she had no evidence of lymphoma.  She had a stable left adnexal dermoid.    [FreeTextEntry1] : s/p rituximab and rituximab maintenance [de-identified] : Recurrent FL, left femoral node, palpable, visible\par \par recurrent FL biopsy proven, left inguinal node, documented in 5/2017.\par observed\par PET/CT 10/2021- incr size, FDG avidity left inguinofemoral node (SUV 10.1), 2.7 cm left  inguinal node (SUV 6.3)\par core bx of growing (5.5 cm) left femoral node 6/10/22 >>> FL, gr 1-2\par c/o pain related to femoral node, RT planned when went back to [place] then pain subsided\par and node partially regressed\par Feels well, no constitutional c/o. \par \par \par \par \par \par \par \par \par

## 2023-03-03 NOTE — ASSESSMENT
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 In 2006, developed significant adenopathy and received 4 weekly doses of Rituxan with excellent response. Bx on 2010 showed FL, gr 1-2 after which she again received 4 weekly doses of Rituxan followed by 2 years of Rituxan maintenance. She remained in remission until 2017 when she developed isolated adenopathy in left inguinal region was bx proven FL. Since was followed without evidence of progression until 2022 when she developed marked Incr size of left femoral node, causing discomfort. Bx showed FL, gr 1-2. Limited EOD.\par CBC results reviewed and d/w pt\par WBC 5.96, Hgb 14.6, Plt 228K, nl diff\par Plan for RT deferred when discomfort resolved, had decr in size of femoral node\par Exam today shows significant decr in size of node\par She is asymptomatic\par Plan to continue observation\par \[facility] Does not have primary care physician; Dr. Ruby Amaya identified as a geriatrician\[facility] who works near her, contact information given to pt.\par Will cont. to see Dr. 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 \[facility] Continues to have regular derm follow up.\par \par Check CMP, [facility], [facility]\par

## 2023-06-14 NOTE — HISTORY OF PRESENT ILLNESS
[Treatment Protocol] : Treatment Protocol yes [de-identified] : Ms. 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 .She receive RT to an axillary area of disease early in her course, and then developed significant  adenopathy in 2006; beginning in April, 2006 she received rituximab weekly x 4 with an excellent response.  Upon progression in Feb., 2010, rebiopsy showed FL, gr 1-2.  She received rituximab weekly x 4 followed by two years of rituximab maintenance using weekly x 4, q6 month schedule.  She had an excellent response.  In Dec., 2010 she was hospitalized in [facility]. with a CAP. She was last imaged in May, 2012: she had no evidence of lymphoma.  She had a stable left adnexal dermoid.    [FreeTextEntry1] : s/p rituximab and rituximab maintenance [de-identified] : Recurrent FL, left femoral node, palpable, visible\par \par recurrent FL biopsy proven, left inguinal node, documented in 5/2017.\par observed\par PET/CT 10/2021- incr size, FDG avidity left inguinofemoral node (SUV 10.1), 2.7 cm left  inguinal node (SUV 6.3)\par core bx of growing (5.5 cm) left femoral node 6/10/22 >>> FL, gr 1-2\par c/o pain related to femoral node, RT planned when went back to [place] then pain subsided\par and node partially regressed\par Feels well, no constitutional c/o. \par \par \par \par \par \par \par \par \par

## 2023-11-30 ENCOUNTER — NON-APPOINTMENT (OUTPATIENT)
Age: 88
End: 2023-11-30